# Patient Record
Sex: MALE | Race: BLACK OR AFRICAN AMERICAN | NOT HISPANIC OR LATINO | URBAN - METROPOLITAN AREA
[De-identification: names, ages, dates, MRNs, and addresses within clinical notes are randomized per-mention and may not be internally consistent; named-entity substitution may affect disease eponyms.]

---
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
## 2024-01-03 NOTE — ED PEDIATRIC NURSE NOTE - ED CARDIAC HEART SOUNDS
Patient offered sooner appointment within 6 top 8 weeks time       Patient RS for 2/14/24 at 2 pm HFU with Hasmukh Kapadia   heart sounds abnormal

## 2024-06-03 NOTE — ED PEDIATRIC NURSE NOTE - CHIEF COMPLAINT QUOTE
-continue home protonix   C/o flu-like symptoms x 2 weeks ago with vomiting, diarrhea,  tactile fever.  Mother reports bouts of confusion x 1 day ago.  Started wheezing since last week.  Last given MDI yesterday.  Pt noted to have coarse breath sounds b/l with moist non-productive cough.  Pt visited with family doctor today who referred to ED for eval of electrolytes.  Pt A & O x3 in triage.